# Patient Record
Sex: FEMALE | Employment: FULL TIME | URBAN - METROPOLITAN AREA
[De-identification: names, ages, dates, MRNs, and addresses within clinical notes are randomized per-mention and may not be internally consistent; named-entity substitution may affect disease eponyms.]

---

## 2023-04-29 ENCOUNTER — OFFICE VISIT (OUTPATIENT)
Dept: URGENT CARE | Facility: CLINIC | Age: 33
End: 2023-04-29
Payer: COMMERCIAL

## 2023-04-29 VITALS
TEMPERATURE: 96.9 F | HEART RATE: 120 BPM | HEIGHT: 71 IN | BODY MASS INDEX: 41.02 KG/M2 | SYSTOLIC BLOOD PRESSURE: 128 MMHG | WEIGHT: 293 LBS | OXYGEN SATURATION: 96 % | DIASTOLIC BLOOD PRESSURE: 72 MMHG | RESPIRATION RATE: 22 BRPM

## 2023-04-29 DIAGNOSIS — U07.1 BRONCHITIS DUE TO COVID-19 VIRUS: ICD-10-CM

## 2023-04-29 DIAGNOSIS — J40 BRONCHITIS DUE TO COVID-19 VIRUS: ICD-10-CM

## 2023-04-29 DIAGNOSIS — J98.01 BRONCHOSPASM: ICD-10-CM

## 2023-04-29 PROCEDURE — 99203 OFFICE O/P NEW LOW 30 MIN: CPT | Performed by: FAMILY MEDICINE

## 2023-04-29 RX ORDER — ALBUTEROL SULFATE 90 UG/1
1-2 AEROSOL, METERED RESPIRATORY (INHALATION) EVERY 6 HOURS PRN
Qty: 1 EACH | Refills: 3 | Status: SHIPPED | OUTPATIENT
Start: 2023-04-29

## 2023-04-29 ASSESSMENT — ENCOUNTER SYMPTOMS
VOMITING: 0
DIZZINESS: 0
MYALGIAS: 0
CHILLS: 0
NAUSEA: 0
SORE THROAT: 0
WHEEZING: 1
SHORTNESS OF BREATH: 1
FEVER: 0

## 2023-04-29 NOTE — PROGRESS NOTES
"Subjective:   Abdias Gaming is a 33 y.o. female who presents for Shortness of Breath (Notes recent covid infection at end of March, wheezing, lingering cough. )        Shortness of Breath  Chronicity: Reports intermittent dyspnea, wheezing. Episode onset: 1 month. The problem occurs intermittently. The problem has been unchanged. Associated symptoms include wheezing. Pertinent negatives include no fever, rash, sore throat or vomiting. Exacerbated by: Reports cough exacerbated wheezing. Associated symptoms comments: Reports recent COVID-19 March 2023, reports remote history of asthma as a child. She has tried rest for the symptoms. The treatment provided no relief. Her past medical history is significant for asthma.   PMH:  has no past medical history on file.  MEDS:   Current Outpatient Medications:     albuterol 108 (90 Base) MCG/ACT Aero Soln inhalation aerosol, Inhale 1-2 Puffs every 6 hours as needed for Shortness of Breath., Disp: 1 Each, Rfl: 3    ibuprofen (MOTRIN) 600 MG Tab, TAKE 1 TABLET BY MOUTH EVERY 6 HOURS AS NEEDED FOR MILD PAIN OR MODERATE PAIN, Disp: , Rfl:   ALLERGIES: No Known Allergies  SURGHX:   Past Surgical History:   Procedure Laterality Date    TONSILLECTOMY AND ADENOIDECTOMY       SOCHX:  reports that she has never smoked. She has never used smokeless tobacco. She reports that she does not currently use alcohol. She reports that she does not use drugs.  FH: History reviewed. No pertinent family history.  Review of Systems   Constitutional:  Negative for chills and fever.   HENT:  Negative for sore throat.    Respiratory:  Positive for shortness of breath and wheezing.    Gastrointestinal:  Negative for nausea and vomiting.   Musculoskeletal:  Negative for myalgias.   Skin:  Negative for rash.   Neurological:  Negative for dizziness.      Objective:   /72   Pulse (!) 120   Temp 36.1 °C (96.9 °F) (Temporal)   Resp (!) 22   Ht 1.803 m (5' 11\")   Wt (!) 204 kg (450 lb) Comment: Unable " to obtain accurate weight, scale stops at 450lb  LMP 04/01/2023 (Exact Date)   SpO2 96%   Breastfeeding No   BMI 62.76 kg/m²   Physical Exam  Vitals and nursing note reviewed.   Constitutional:       General: She is not in acute distress.     Appearance: She is well-developed.   HENT:      Head: Normocephalic and atraumatic.      Right Ear: External ear normal.      Left Ear: External ear normal.      Nose: Nose normal.      Mouth/Throat:      Mouth: Mucous membranes are moist.      Pharynx: No oropharyngeal exudate or posterior oropharyngeal erythema.   Eyes:      Conjunctiva/sclera: Conjunctivae normal.   Cardiovascular:      Rate and Rhythm: Normal rate.   Pulmonary:      Effort: Pulmonary effort is normal. No respiratory distress.      Breath sounds: Wheezing (Trace expiratory) present. No rhonchi.   Abdominal:      General: There is no distension.   Musculoskeletal:         General: Normal range of motion.   Skin:     General: Skin is warm and dry.   Neurological:      General: No focal deficit present.      Mental Status: She is alert and oriented to person, place, and time. Mental status is at baseline.      Gait: Gait (gait at baseline) normal.   Psychiatric:         Judgment: Judgment normal.         Assessment/Plan:   1. Bronchitis due to COVID-19 virus  - albuterol 108 (90 Base) MCG/ACT Aero Soln inhalation aerosol; Inhale 1-2 Puffs every 6 hours as needed for Shortness of Breath.  Dispense: 1 Each; Refill: 3    2. Bronchospasm  - albuterol 108 (90 Base) MCG/ACT Aero Soln inhalation aerosol; Inhale 1-2 Puffs every 6 hours as needed for Shortness of Breath.  Dispense: 1 Each; Refill: 3        Medical Decision Making/Course:  In the course of preparing for this visit with review of the pertinent past medical history, recent and past clinic visits, current medications, and performing chart, immunization, medical history and medication reconciliation, and in the further course of obtaining the current  history pertinent to the clinic visit today, performing an exam and evaluation, ordering and independently evaluating labs, tests  , and/or procedures, prescribing any recommended new medications as noted above, providing any pertinent counseling and education and recommending further coordination of care including recommendations for symptomatic and supportive measures, at least  21 minutes of total time were spent during this encounter.      Discussed close monitoring, return precautions, and supportive measures of maintaining adequate fluid hydration and caloric intake, relative rest and symptom management as needed for pain and/or fever.    Differential diagnosis, natural history, supportive care, and indications for immediate follow-up discussed.     Advised the patient to follow-up with the primary care physician for recheck, reevaluation, and consideration of further management.    Please note that this dictation was created using voice recognition software. I have worked with consultants from the vendor as well as technical experts from [email or web address] [employer] to optimize the interface. I have made every reasonable attempt to correct obvious errors, but I expect that there are errors of grammar and possibly content that I did not discover before finalizing the note.

## 2023-04-29 NOTE — PATIENT INSTRUCTIONS
Bronchospasm, Adult    Bronchospasm is when airways in the lungs get smaller. When this happens, it can be hard to breathe. You may cough. You may also make a whistling sound when you breathe (wheeze).  Follow these instructions at home:  Medicines  Take over-the-counter and prescription medicines only as told by your doctor.  If you need to use an inhaler or nebulizer to take your medicine, ask your doctor how to use it.  If you were given a spacer, always use it with your inhaler.  Lifestyle  Change your heating and air conditioning filter. Do this at least once a month.  Try not to use fireplaces and wood stoves.  Do not  smoke. Do not  allow smoking in your home.  Try not to use things that have a strong smell, like perfume.  Get rid of pests (such as roaches and mice) and their poop.  Remove any mold from your home.  Keep your house clean. Get rid of dust.  Use cleaning products that have no smell.   Replace carpet with wood, tile, or vinyl jermaine.  Use allergy-proof pillows, mattress covers, and box spring covers.  Wash bed sheets and blankets every week. Use hot water. Dry them in a dryer.  Use blankets that are made of polyester or cotton.  Wash your hands often.  Keep pets out of your bedroom.  When you exercise, try not to breathe in cold air.  General instructions  Have a plan for getting medical care. Know these things:  When to call your doctor.  When to call local emergency services (911 in the [place]  Where to go in an emergency.  Stay up to date on your shots (immunizations).  When you have an episode:  Stay calm.  Relax.  Breathe slowly.  Contact a doctor if:  Your muscles ache.  Your chest hurts.  The color of the mucus you cough up (sputum) changes from clear or white to yellow, green, gray, or bloody.  The mucus you cough up gets thicker.  You have a fever.  Get help right away if:  The whistling sound gets worse, even after you take your medicines.  Your coughing gets worse.  You find it even  harder to breathe.  Your chest hurts very much.  Summary  Bronchospasm is when airways in the lungs get smaller.  When this happens, it can be hard to breathe. You may cough. You may also make a whistling sound when you breathe.  Stay away from things that cause you to have episodes. These include smoke or dust.  This information is not intended to replace advice given to you by your health care provider. Make sure you discuss any questions you have with your health care provider.  Document Released: 10/15/2010 Document Revised: 11/30/2018 Document Reviewed: 12/21/2017  Elsevier Patient Education © 2020 [employer]

## 2024-11-19 ENCOUNTER — OFFICE VISIT (OUTPATIENT)
Dept: URGENT CARE | Facility: CLINIC | Age: 34
End: 2024-11-19
Payer: COMMERCIAL

## 2024-11-19 VITALS
BODY MASS INDEX: 64.57 KG/M2 | HEART RATE: 96 BPM | OXYGEN SATURATION: 96 % | SYSTOLIC BLOOD PRESSURE: 128 MMHG | DIASTOLIC BLOOD PRESSURE: 88 MMHG | TEMPERATURE: 97.4 F | HEIGHT: 70 IN | RESPIRATION RATE: 18 BRPM

## 2024-11-19 DIAGNOSIS — Z76.0 MEDICATION REFILL: ICD-10-CM

## 2024-11-19 DIAGNOSIS — Z87.09 HISTORY OF ASTHMA: ICD-10-CM

## 2024-11-19 DIAGNOSIS — R06.2 WHEEZING: ICD-10-CM

## 2024-11-19 PROCEDURE — 3079F DIAST BP 80-89 MM HG: CPT | Performed by: NURSE PRACTITIONER

## 2024-11-19 PROCEDURE — 3074F SYST BP LT 130 MM HG: CPT | Performed by: NURSE PRACTITIONER

## 2024-11-19 PROCEDURE — 99213 OFFICE O/P EST LOW 20 MIN: CPT | Performed by: NURSE PRACTITIONER

## 2024-11-19 RX ORDER — ALBUTEROL SULFATE 90 UG/1
1-2 INHALANT RESPIRATORY (INHALATION) EVERY 6 HOURS PRN
Qty: 8.5 G | Refills: 0 | Status: SHIPPED | OUTPATIENT
Start: 2024-11-19

## 2024-11-19 NOTE — LETTER
November 19, 2024       Patient: Abdias Gaming   YOB: 1990   Date of Visit: 11/19/2024         To Whom It May Concern:    In my medical opinion, I recommend that Abdias return to full duty, no restrictions today.               Sincerely,          ISABELL Mckeon  Electronically Signed

## 2024-11-19 NOTE — PROGRESS NOTES
"Abdias Gaming is a 34 y.o. female who presents for Medication Refill (Inhaler refill on albuterol )      HPI  This is a new problem. Abdias Gaming is a 34 y.o. patient who presents to urgent care with c/o: History of asthma most of her life.  She has occasional wheezing that she needs to have a inhaler for.  The cold weather makes her wheeze worse.  She has no current symptoms of bronchitis or upper respiratory infection.  She is just feeling herself wheeze a little more.  She ran out of her albuterol and is requesting a refill.  She currently does not have a PCP and would like to get established.  No other aggravating leaving factors.  Denies shortness of breath, fever, cough, orthopnea.    ROS See HPI    Allergies:     No Known Allergies    PMSFS Hx:  History reviewed. No pertinent past medical history.  Past Surgical History:   Procedure Laterality Date    TONSILLECTOMY AND ADENOIDECTOMY       History reviewed. No pertinent family history.  Social History     Tobacco Use    Smoking status: Never    Smokeless tobacco: Never   Substance Use Topics    Alcohol use: Not Currently         Problems:   There is no problem list on file for this patient.      Medications:   Current Outpatient Medications on File Prior to Visit   Medication Sig Dispense Refill    ibuprofen (MOTRIN) 600 MG Tab TAKE 1 TABLET BY MOUTH EVERY 6 HOURS AS NEEDED FOR MILD PAIN OR MODERATE PAIN (Patient not taking: Reported on 11/19/2024)       No current facility-administered medications on file prior to visit.        Objective:     /88 (BP Location: Left arm, Patient Position: Sitting, BP Cuff Size: Large adult)   Pulse 96   Temp 36.3 °C (97.4 °F)   Resp 18   Ht 1.778 m (5' 10\")   SpO2 96%   BMI 64.57 kg/m²     Physical Exam  Vitals reviewed.   Constitutional:       Appearance: Normal appearance.   Cardiovascular:      Rate and Rhythm: Normal rate and regular rhythm.      Pulses: Normal pulses.      Heart sounds: Normal heart sounds. "   Pulmonary:      Effort: Pulmonary effort is normal.      Breath sounds: Wheezing (Fine upper lobe expiratory wheezes) present.   Skin:     General: Skin is warm and dry.      Capillary Refill: Capillary refill takes less than 2 seconds.   Neurological:      Mental Status: She is alert and oriented to person, place, and time.   Psychiatric:         Mood and Affect: Mood normal.         Behavior: Behavior normal.         Assessment /Associated Orders:      1. History of asthma  Referral to establish with PCP    albuterol 108 (90 Base) MCG/ACT Aero Soln inhalation aerosol      2. Wheezing  albuterol 108 (90 Base) MCG/ACT Aero Soln inhalation aerosol      3. Medication refill            Medical Decision Making:    Abdias Gaming is a very pleasant 34 y.o. female who is clinically stable at today's acute urgent care visit.    No acute distress is noted.  VSS. Appropriate for outpatient care at this time.   Acute problem today with uncertain prognosis.     Educated in proper administration of  prescription medication(s) ordered today including safety, possible SE, risks, benefits, rationale and alternatives to therapy.   Schedule appt to establish with PCP - referral place.   Stay well hydrated     Through shared decision making a discussion of the Dx and DDx, management options (risks,benefits, and alternatives to planned treatment), natural progression and supportive care.  Expressed understanding and the treatment plan was agreed upon.   Questions were encouraged and answered   Return to urgent care prn if new or worsening sx or if there is no improvement in condition prn.            Please note that this dictation was created using voice recognition software. I have worked with consultants from the vendor as well as technical experts from [employer] [employer] to optimize the interface. I have made every reasonable attempt to correct obvious errors, but I expect that there are errors of grammar and possibly content that I did  not discover before finalizing the note.  This note was electronically signed by provider

## 2025-01-15 ENCOUNTER — TELEPHONE (OUTPATIENT)
Dept: HEALTH INFORMATION MANAGEMENT | Facility: OTHER | Age: 35
End: 2025-01-15
Payer: COMMERCIAL